# Patient Record
Sex: MALE | Race: OTHER | HISPANIC OR LATINO
[De-identification: names, ages, dates, MRNs, and addresses within clinical notes are randomized per-mention and may not be internally consistent; named-entity substitution may affect disease eponyms.]

---

## 2017-11-22 ENCOUNTER — LABORATORY RESULT (OUTPATIENT)
Age: 37
End: 2017-11-22

## 2017-11-22 ENCOUNTER — APPOINTMENT (OUTPATIENT)
Dept: INTERNAL MEDICINE | Facility: CLINIC | Age: 37
End: 2017-11-22
Payer: COMMERCIAL

## 2017-11-22 VITALS
DIASTOLIC BLOOD PRESSURE: 70 MMHG | OXYGEN SATURATION: 97 % | SYSTOLIC BLOOD PRESSURE: 113 MMHG | HEART RATE: 73 BPM | WEIGHT: 149 LBS | TEMPERATURE: 98.3 F | HEIGHT: 67 IN | BODY MASS INDEX: 23.39 KG/M2

## 2017-11-22 DIAGNOSIS — B35.1 TINEA UNGUIUM: ICD-10-CM

## 2017-11-22 DIAGNOSIS — B96.89 PERSONAL HISTORY OF OTHER DISEASES OF THE RESPIRATORY SYSTEM: ICD-10-CM

## 2017-11-22 DIAGNOSIS — Z87.09 PERSONAL HISTORY OF OTHER DISEASES OF THE RESPIRATORY SYSTEM: ICD-10-CM

## 2017-11-22 LAB
BILIRUB UR QL STRIP: NEGATIVE
CLARITY UR: CLEAR
COLLECTION METHOD: NORMAL
GLUCOSE UR-MCNC: NEGATIVE
HCG UR QL: 0.2 EU/DL
HGB UR QL STRIP.AUTO: NORMAL
KETONES UR-MCNC: NEGATIVE
LEUKOCYTE ESTERASE UR QL STRIP: NEGATIVE
NITRITE UR QL STRIP: NEGATIVE
PH UR STRIP: 6
PROT UR STRIP-MCNC: NEGATIVE
SP GR UR STRIP: 1.02

## 2017-11-22 PROCEDURE — 99395 PREV VISIT EST AGE 18-39: CPT | Mod: 25

## 2017-11-22 PROCEDURE — 36415 COLL VENOUS BLD VENIPUNCTURE: CPT

## 2017-11-22 PROCEDURE — 81002 URINALYSIS NONAUTO W/O SCOPE: CPT

## 2017-11-22 RX ORDER — AZITHROMYCIN 250 MG/1
250 TABLET, FILM COATED ORAL
Qty: 6 | Refills: 0 | Status: COMPLETED | COMMUNITY
Start: 2017-06-16

## 2017-11-26 LAB
25(OH)D3 SERPL-MCNC: 38 NG/ML
BASOPHILS # BLD AUTO: 0.01 K/UL
BASOPHILS NFR BLD AUTO: 0.2 %
CHOLEST SERPL-MCNC: 229 MG/DL
CHOLEST/HDLC SERPL: 5.9 RATIO
EOSINOPHIL # BLD AUTO: 0.15 K/UL
EOSINOPHIL NFR BLD AUTO: 2.5
HBA1C MFR BLD HPLC: 5.7 %
HCT VFR BLD CALC: 45.5 %
HDLC SERPL-MCNC: 39 MG/DL
HGB BLD-MCNC: 14.8 G/DL
IMM GRANULOCYTES NFR BLD AUTO: 0.2 %
LDLC SERPL CALC-MCNC: 138 MG/DL
LYMPHOCYTES # BLD AUTO: 2.78 K/UL
LYMPHOCYTES NFR BLD AUTO: 45.6 %
MAN DIFF?: NORMAL
MCHC RBC-ENTMCNC: 28.3 PG
MCHC RBC-ENTMCNC: 32.5 GM/DL
MCV RBC AUTO: 87 FL
MONOCYTES # BLD AUTO: 0.22 K/UL
MONOCYTES NFR BLD AUTO: 3.6 %
NEUTROPHILS # BLD AUTO: 2.92 K/UL
NEUTROPHILS NFR BLD AUTO: 47.9 %
PLATELET # BLD AUTO: 189 K/UL
RBC # BLD: 5.23 M/UL
RBC # FLD: 14.4 %
T4 SERPL-MCNC: 7.6 UG/DL
TRIGL SERPL-MCNC: 259 MG/DL
TSH SERPL-ACNC: 2.01 UIU/ML
WBC # FLD AUTO: 6.09 K/UL

## 2019-03-02 ENCOUNTER — TRANSCRIPTION ENCOUNTER (OUTPATIENT)
Age: 39
End: 2019-03-02

## 2019-03-20 ENCOUNTER — APPOINTMENT (OUTPATIENT)
Dept: INTERNAL MEDICINE | Facility: CLINIC | Age: 39
End: 2019-03-20
Payer: COMMERCIAL

## 2019-03-20 VITALS
TEMPERATURE: 98 F | OXYGEN SATURATION: 98 % | BODY MASS INDEX: 23.39 KG/M2 | DIASTOLIC BLOOD PRESSURE: 79 MMHG | HEART RATE: 72 BPM | SYSTOLIC BLOOD PRESSURE: 137 MMHG | WEIGHT: 149 LBS | HEIGHT: 67 IN

## 2019-03-20 DIAGNOSIS — Z00.00 ENCOUNTER FOR GENERAL ADULT MEDICAL EXAMINATION W/OUT ABNORMAL FINDINGS: ICD-10-CM

## 2019-03-20 LAB
BILIRUB UR QL STRIP: NEGATIVE
GLUCOSE UR-MCNC: NEGATIVE
HCG UR QL: 0.2 EU/DL
HGB UR QL STRIP.AUTO: NORMAL
KETONES UR-MCNC: NEGATIVE
LEUKOCYTE ESTERASE UR QL STRIP: NEGATIVE
NITRITE UR QL STRIP: NEGATIVE
PH UR STRIP: 7
PROT UR STRIP-MCNC: NEGATIVE
SP GR UR STRIP: 1.01

## 2019-03-20 PROCEDURE — 36415 COLL VENOUS BLD VENIPUNCTURE: CPT

## 2019-03-20 PROCEDURE — 99396 PREV VISIT EST AGE 40-64: CPT | Mod: 25

## 2019-03-20 NOTE — HISTORY OF PRESENT ILLNESS
[FreeTextEntry1] : Annual exam [de-identified] : Annual exam\par had flu shot\par Work for [employer]\par no complaints

## 2019-03-20 NOTE — HEALTH RISK ASSESSMENT
[Very Good] : ~his/her~  mood as very good [No falls in past year] : Patient reported no falls in the past year [] : No [0] : 1) Little interest or pleasure doing things: Not at all (0) [[id number]] : 0 [Change in mental status noted] : No change in mental status noted [Language] : denies difficulty with language [Behavior] : denies difficulty with behavior [Learning/Retaining New Information] : denies difficulty learning/retaining new information [Handling Complex Tasks] : denies difficulty handling complex tasks [Reasoning] : denies difficulty with reasoning [Spatial Ability and Orientation] : denies difficulty with spatial ability and orientation [None] : None [With Family] : lives with family [Employed] : employed [] :  [Sexually Active] : not sexually active [Feels Safe at Home] : Feels safe at home [Fully functional (using the telephone, shopping, preparing meals, housekeeping, doing laundry, using] : Fully functional and needs no help or supervision to perform IADLs (using the telephone, shopping, preparing meals, housekeeping, doing laundry, using transportation, managing medications and managing finances) [Fully functional (bathing, dressing, toileting, transferring, walking, feeding)] : Fully functional (bathing, dressing, toileting, transferring, walking, feeding) [Reports changes in vision] : Reports no changes in vision [Reports changes in hearing] : Reports no changes in hearing [Smoke Detector] : smoke detector [Reports changes in dental health] : Reports no changes in dental health [Carbon Monoxide Detector] : carbon monoxide detector [Guns at Home] : no guns at home [Seat Belt] :  uses seat belt

## 2019-03-21 LAB
25(OH)D3 SERPL-MCNC: 36.2 NG/ML
ALBUMIN SERPL ELPH-MCNC: 5.1 G/DL
ALP BLD-CCNC: 68 U/L
ALT SERPL-CCNC: 15 U/L
ANION GAP SERPL CALC-SCNC: 12 MMOL/L
AST SERPL-CCNC: 17 U/L
BASOPHILS # BLD AUTO: 0.02 K/UL
BASOPHILS NFR BLD AUTO: 0.4 %
BILIRUB SERPL-MCNC: 1 MG/DL
BUN SERPL-MCNC: 14 MG/DL
CALCIUM SERPL-MCNC: 10.1 MG/DL
CHLORIDE SERPL-SCNC: 101 MMOL/L
CHOLEST SERPL-MCNC: 208 MG/DL
CHOLEST/HDLC SERPL: 5.5 RATIO
CO2 SERPL-SCNC: 28 MMOL/L
CREAT SERPL-MCNC: 0.91 MG/DL
EOSINOPHIL # BLD AUTO: 0.12 K/UL
EOSINOPHIL NFR BLD AUTO: 2.3 %
GLUCOSE SERPL-MCNC: 102 MG/DL
HBA1C MFR BLD HPLC: 5.4 %
HCT VFR BLD CALC: 41.7 %
HDLC SERPL-MCNC: 38 MG/DL
HGB BLD-MCNC: 13.2 G/DL
IMM GRANULOCYTES NFR BLD AUTO: 0.2 %
LDLC SERPL CALC-MCNC: 108 MG/DL
LYMPHOCYTES # BLD AUTO: 1.97 K/UL
LYMPHOCYTES NFR BLD AUTO: 37 %
MAN DIFF?: NORMAL
MCHC RBC-ENTMCNC: 28.6 PG
MCHC RBC-ENTMCNC: 31.7 GM/DL
MCV RBC AUTO: 90.3 FL
MONOCYTES # BLD AUTO: 0.25 K/UL
MONOCYTES NFR BLD AUTO: 4.7 %
NEUTROPHILS # BLD AUTO: 2.96 K/UL
NEUTROPHILS NFR BLD AUTO: 55.4 %
PLATELET # BLD AUTO: 224 K/UL
POTASSIUM SERPL-SCNC: 4.3 MMOL/L
PROT SERPL-MCNC: 7.7 G/DL
RBC # BLD: 4.62 M/UL
RBC # FLD: 14.4 %
SODIUM SERPL-SCNC: 140 MMOL/L
T4 SERPL-MCNC: 6.8 UG/DL
TRIGL SERPL-MCNC: 311 MG/DL
TSH SERPL-ACNC: 1.92 UIU/ML
WBC # FLD AUTO: 5.33 K/UL

## 2023-01-01 NOTE — PHYSICAL EXAM
Recent blood test continue to show elevated calcium.  Your vitamin D is low at 14.  FAX ALL RESULTS to patient endocrinologist see patient care team [id number] [No Acute Distress] : no acute distress [Well Nourished] : well nourished [Well-Appearing] : well-appearing [Well Developed] : well developed [Normal Sclera/Conjunctiva] : normal sclera/conjunctiva [PERRL] : pupils equal round and reactive to light [Normal Outer Ear/Nose] : the outer ears and nose were normal in appearance [EOMI] : extraocular movements intact [Normal Oropharynx] : the oropharynx was normal [No JVD] : no jugular venous distention [Supple] : supple [No Lymphadenopathy] : no lymphadenopathy [Thyroid Normal, No Nodules] : the thyroid was normal and there were no nodules present [No Respiratory Distress] : no respiratory distress  [Clear to Auscultation] : lungs were clear to auscultation bilaterally [No Accessory Muscle Use] : no accessory muscle use [Normal Rate] : normal rate  [Regular Rhythm] : with a regular rhythm [Normal S1, S2] : normal S1 and S2 [No Carotid Bruits] : no carotid bruits [No Murmur] : no murmur heard [No Abdominal Bruit] : a ~M bruit was not heard ~T in the abdomen [Pedal Pulses Present] : the pedal pulses are present [No Varicosities] : no varicosities [No Edema] : there was no peripheral edema [No Extremity Clubbing/Cyanosis] : no extremity clubbing/cyanosis [No Palpable Aorta] : no palpable aorta [Non Tender] : non-tender [Soft] : abdomen soft [Non-distended] : non-distended [No Masses] : no abdominal mass palpated [No HSM] : no HSM [Normal Bowel Sounds] : normal bowel sounds [Normal Posterior Cervical Nodes] : no posterior cervical lymphadenopathy [Normal Anterior Cervical Nodes] : no anterior cervical lymphadenopathy [No CVA Tenderness] : no CVA  tenderness [No Spinal Tenderness] : no spinal tenderness [No Joint Swelling] : no joint swelling [Grossly Normal Strength/Tone] : grossly normal strength/tone [No Rash] : no rash [Normal Gait] : normal gait [Coordination Grossly Intact] : coordination grossly intact [No Focal Deficits] : no focal deficits [Deep Tendon Reflexes (DTR)] : deep tendon reflexes were 2+ and symmetric [Normal Affect] : the affect was normal [Normal Insight/Judgement] : insight and judgment were intact

## 2023-07-04 ENCOUNTER — NON-APPOINTMENT (OUTPATIENT)
Age: 43
End: 2023-07-04

## 2023-08-23 ENCOUNTER — APPOINTMENT (OUTPATIENT)
Dept: ORTHOPEDIC SURGERY | Facility: CLINIC | Age: 43
End: 2023-08-23
Payer: COMMERCIAL

## 2023-08-23 PROCEDURE — 73030 X-RAY EXAM OF SHOULDER: CPT | Mod: RT

## 2023-08-23 PROCEDURE — 20610 DRAIN/INJ JOINT/BURSA W/O US: CPT | Mod: RT

## 2023-08-23 PROCEDURE — 99204 OFFICE O/P NEW MOD 45 MIN: CPT | Mod: 25

## 2023-08-23 NOTE — PHYSICAL EXAM
[de-identified] : Constitutional o Appearance : well-nourished, well developed, alert, in no acute distress  Head and Face o Head :  Inspection : atraumatic, normocephalic o Face :  Inspection : no visible rash or discoloration Respiratory o Respiratory Effort: breathing unlabored  Neurologic o Sensation : Normal sensation  Psychiatric o Mood and Affect: mood normal, affect appropriate  Lymphatic o Additional Nodes : No palpable lymph nodes present   Cervical Spine o Inspection/Palpation :  Inspection : alignment midline, normal degree of lordosis present  Skin : normal appearance, no masses or tenderness, trachea midline  Palpation : no paracervical musculature is nontender to palpation o Range of Motion : arc of motion full in all planes, no crepitance or pain with ROM o Tests: Negative Spurlings test  Right Upper Extremity o Right Shoulder :  Inspection/Palpation : no AC joint or anterior capsular tenderness, no swelling or deformities  Range of Motion : pain with eccentric motion and initiation of forward flexion, no crepitance  Strength : forward elevation 4+/5 limited by pain, IR 4+/5, ER 4+/5, ER at 90 of abduction 5/5, supraspinatus 4+/5 limited by pain adduction 5/5, abduction 5/5, biceps/triceps 5/5,  5/5  Stability : no joint instability on provocative testing   Tests: Pepper positive (reverted to normal), Neer negative, Gilbert positive (reverted to normal) drop arm test negative, Randall's test negative  Left Upper Extremity o Left Shoulder :  Inspection/Palpation : no tenderness, no swelling or deformities  Range of Motion : full and painless in all planes, no crepitance  Strength : forward elevation 5/5, IR 5/5, ER 5/5, ER at 90 of abduction 5/5, supraspinatus 5/5, adduction 5/5, abduction 5/5, biceps/triceps 5/5,  5/5  Stability : no joint instability on provocative testing   Tests: Pepper negative, Neer negative, Gilbert negative, drop arm test negative, Randall's test negative  Gait and Station: Gait: gait normal, no significant extremity swelling or lymphedema, good proprioception and balance  Radiology Results:  o Right Shoulder: AP internal/external rotation and outlet views were obtained and revealed sclerosis of the greater tuberosity with slight a down sloping acromion and no significant degenerative arthritis   o Shoulder : Indication- right shoulder Impingement, Anatomic location- right subacromial space, Spray - area was sterilized with Betadine and alcohol and anesthetized with Ethyl Chloride , needle used-22G, Medications given- 5cc's lidocaine, 0.5cc's kenalog, 0.5 cc's dexamethasone. The patient tolerated the procedure well. He demonstrated significant improvement in ROM and strength immediately after the injection.

## 2023-08-23 NOTE — HISTORY OF PRESENT ILLNESS
[de-identified] : 43-year-old RHD male presents complaining of right shoulder pain that started about 1 year ago. It has progressively gotten worse over the past few months. He denies specific injury. He states that his pain is worst on the lateral aspect of his shoulder. It has radiated to his elbow on few occasions. He notes that he has good ROM if he moves slowly but notes weakness. He denies neck pain. Patient denies that he has any numbness or tingling.  Quick movements elicit sharp pain. Abduction and internal rotation tend to be the most uncomfortable. He has intermittent pain when sleeping. He does not feel he has lost strength. He has taken Tylenol, which helps a bit.

## 2023-08-23 NOTE — DISCUSSION/SUMMARY
[de-identified] : I went over the pathophysiology of the patient's symptoms in great detail with the patient. I discussed the underlying pathophysiology of the patient's condition in great detail with the patient. I went over the patient's x-rays with them in great detail. The patient elected to receive a cortisone injection into his left shoulder today and tolerated it well. I instructed the patient on ROM exercises and told them to take it easy. The use of ice and rest was reviewed with the patient. The patient may resume activities tomorrow. At-home strengthening exercises were discussed and demonstrated with the patient. He should avoid lifting the right shoulder overhead or past his shoulder level.   All of their questions were answered. They understand and consent to the plan.   FU in one month. If he does not improve, we will request an MRI.

## 2023-08-23 NOTE — ADDENDUM
[FreeTextEntry1] : I, BONG MELTON, acted solely as a scribe for Dr. South Jimenez on this date 08/23/2023.  All medical record entries made by the Scribe were at my, Dr. South Jimenez, direction and personally dictated by me on 08/23/2023. I have reviewed the chart and agree that the record accurately reflects my personal performance of the history, physical exam, assessment and plan. I have also personally directed, reviewed, and agreed with the chart.

## 2024-02-05 ENCOUNTER — APPOINTMENT (OUTPATIENT)
Dept: ORTHOPEDIC SURGERY | Facility: CLINIC | Age: 44
End: 2024-02-05
Payer: COMMERCIAL

## 2024-02-05 PROCEDURE — 99214 OFFICE O/P EST MOD 30 MIN: CPT

## 2024-02-05 NOTE — HISTORY OF PRESENT ILLNESS
[de-identified] : 43-year-old RHD male presents for follow up of right shoulder pain. He was last seen on 08/23/23. He was given a cortisone injection on 08/23/23. It has progressively gotten worse over the past few months. He denies specific injury. He states that his pain is worst on the lateral aspect of his shoulder. It has radiated to his elbow on few occasions. He notes that he has good ROM if he moves slowly but notes weakness. He denies neck pain. Patient denies that he has any numbness or tingling.  Quick movements elicit sharp pain. Abduction and internal rotation tend to be the most uncomfortable. He has intermittent pain when sleeping. He does not feel he has lost strength. He has taken Tylenol, which helps a bit.  He was given a steroid shot in the past and that made him have a whole-body rash.  He does not believe it helped.  He still complains of pain especially at night and with certain arcs of motion.  Radiology Results 08/23/23: o Right Shoulder: AP internal/external rotation and outlet views were obtained and revealed sclerosis of the greater tuberosity with slight a down sloping acromion and no significant degenerative arthritis  18

## 2024-02-05 NOTE — DISCUSSION/SUMMARY
[de-identified] : At this point since she did not improve with the steroid shot and continues to have pain despite home exercises we feel an MRI is necessary to further delineate his problem.  He will follow-up after the MRI is done

## 2024-02-05 NOTE — PHYSICAL EXAM
[de-identified] : Constitutional o Appearance : well-nourished, well developed, alert, in no acute distress  Head and Face o Head :  Inspection : atraumatic, normocephalic o Face :  Inspection : no visible rash or discoloration Respiratory o Respiratory Effort: breathing unlabored  Neurologic o Sensation : Normal sensation  Psychiatric o Mood and Affect: mood normal, affect appropriate  Lymphatic o Additional Nodes : No palpable lymph nodes present   Cervical Spine o Inspection/Palpation :  Inspection : alignment midline, normal degree of lordosis present  Skin : normal appearance, no masses or tenderness, trachea midline  Palpation : no paracervical musculature is nontender to palpation o Range of Motion : arc of motion full in all planes, no crepitance or pain with ROM o Tests: Negative Spurlings test  Right Upper Extremity o Right Shoulder :  Inspection/Palpation : no AC joint or anterior capsular tenderness, no swelling or deformities  Range of Motion : pain with eccentric motion and initiation of forward flexion, no crepitance  Strength : forward elevation 4+/5 limited by pain, IR 4+/5, ER 4+/5, ER at 90 of abduction 5/5, supraspinatus 4+/5 limited by pain adduction 5/5, abduction 5/5, biceps/triceps 5/5,  5/5  Stability : no joint instability on provocative testing   Tests: Pepper positive (reverted to normal), Neer negative, Gilbert positive (reverted to normal) drop arm test negative, Todd's test negative  Left Upper Extremity o Left Shoulder :  Inspection/Palpation : no tenderness, no swelling or deformities  Range of Motion : full and painless in all planes, no crepitance  Strength : forward elevation 5/5, IR 5/5, ER 5/5, ER at 90 of abduction 5/5, supraspinatus 5/5, adduction 5/5, abduction 5/5, biceps/triceps 5/5,  5/5  Stability : no joint instability on provocative testing   Tests: Pepper negative, Neer negative, Gilbert negative, drop arm test negative, Todd's test negative  Gait and Station: Gait: gait normal, no significant extremity swelling or lymphedema, good proprioception and balance

## 2024-02-26 ENCOUNTER — APPOINTMENT (OUTPATIENT)
Dept: MRI IMAGING | Facility: CLINIC | Age: 44
End: 2024-02-26
Payer: COMMERCIAL

## 2024-02-26 ENCOUNTER — OUTPATIENT (OUTPATIENT)
Dept: OUTPATIENT SERVICES | Facility: HOSPITAL | Age: 44
LOS: 1 days | End: 2024-02-26
Payer: COMMERCIAL

## 2024-02-26 DIAGNOSIS — M75.41 IMPINGEMENT SYNDROME OF RIGHT SHOULDER: ICD-10-CM

## 2024-02-26 PROCEDURE — 73221 MRI JOINT UPR EXTREM W/O DYE: CPT | Mod: 26,RT

## 2024-02-26 PROCEDURE — 73221 MRI JOINT UPR EXTREM W/O DYE: CPT

## 2024-02-29 ENCOUNTER — NON-APPOINTMENT (OUTPATIENT)
Age: 44
End: 2024-02-29

## 2024-03-05 ENCOUNTER — APPOINTMENT (OUTPATIENT)
Dept: ORTHOPEDIC SURGERY | Facility: CLINIC | Age: 44
End: 2024-03-05
Payer: COMMERCIAL

## 2024-03-05 PROCEDURE — 99214 OFFICE O/P EST MOD 30 MIN: CPT

## 2024-03-05 NOTE — PHYSICAL EXAM
[de-identified] : Constitutional o Appearance : well-nourished, well developed, alert, in no acute distress  Head and Face o Head :  Inspection : atraumatic, normocephalic o Face :  Inspection : no visible rash or discoloration Respiratory o Respiratory Effort: breathing unlabored  Neurologic o Sensation : Normal sensation  Psychiatric o Mood and Affect: mood normal, affect appropriate  Lymphatic o Additional Nodes : No palpable lymph nodes present   Cervical Spine o Inspection/Palpation :  Inspection : alignment midline, normal degree of lordosis present  Skin : normal appearance, no masses or tenderness, trachea midline  Palpation : no paracervical musculature is nontender to palpation o Range of Motion : arc of motion full in all planes, no crepitance or pain with ROM o Tests: Negative Spurlings test  Right Upper Extremity o Right Shoulder :  Inspection/Palpation : no AC joint or anterior capsular tenderness, no swelling or deformities  Range of Motion : pain with eccentric motion and initiation of forward flexion, no crepitance  Strength : forward elevation 4+/5 limited by pain, IR 4+/5, ER 4+/5, ER at 90 of abduction 5/5, supraspinatus 4+/5 limited by pain adduction 5/5, abduction 5/5, biceps/triceps 5/5,  5/5  Stability : no joint instability on provocative testing   Tests: Pepper positive (reverted to normal), Neer negative, Gilbert positive (reverted to normal) drop arm test negative, Lamb's test negative  Left Upper Extremity o Left Shoulder :  Inspection/Palpation : no tenderness, no swelling or deformities  Range of Motion : full and painless in all planes, no crepitance  Strength : forward elevation 5/5, IR 5/5, ER 5/5, ER at 90 of abduction 5/5, supraspinatus 5/5, adduction 5/5, abduction 5/5, biceps/triceps 5/5,  5/5  Stability : no joint instability on provocative testing   Tests: Pepper negative, Neer negative, Gilbert negative, drop arm test negative, Lamb's test negative  Gait and Station: Gait: gait normal, no significant extremity swelling or lymphedema, good proprioception and balance

## 2024-03-05 NOTE — HISTORY OF PRESENT ILLNESS
[de-identified] : 44-year-old RHD male presents for an MRI review of his right shoulder today 3/5/2024. I went over the patient's MRI results with them in great detail and used models to aid in my explanation. Patient denies that he has any numbness or tingling.   [facility] MRI OF THE RIGHT SHOULDER revealed: done on 2/26/2024  IMPRESSION:  1.  Severe supraspinatus tendinosis with superimposed high-grade partial-thickness articular-sided tear of the distal fibers and focal full-thickness perforation of the insertional fibers. Moderate subscapularis tendinosis with low-grade partial-thickness articular sided tearing of the cranial fibers. 2.  Mildly decreased acromiohumeral interval, possible mild subacromial impingement.  --- End of Report ---    Radiology Results 08/23/23: o Right Shoulder: AP internal/external rotation and outlet views were obtained and revealed sclerosis of the greater tuberosity with slight a down sloping acromion and no significant degenerative arthritis

## 2024-03-05 NOTE — DISCUSSION/SUMMARY
[de-identified] : I went over the pathophysiology of the patient's symptoms in great detail with the patient. I went over the patient's MRI results with them in great detail and used models to aid in my explanation. I informed the patient that surgery, a right shoulder arthroscopy, will be required to provide them long term relief from their symptoms. Patient understands that he needs to consider an arthroscopy given conservative measures are not providing enough relief and due to the results of the MRI. We had a lengthy discussion about the patient's issues, and talked about the benefits and risks of the procedure. The patient understands the most common risks include infection, allergy to the anesthetic and stiffness of the shoulder. The risks are accepted. The patient was given no assurances that all the symptoms will be alleviated. I informed the patient that I no longer operate. I provided the names of Dr. Pablo and Dr. Keen.   All of their questions were answered. They understand and consent to the plan.   GAMAL ALMONTEN.

## 2024-03-05 NOTE — ADDENDUM
[FreeTextEntry1] : I, BONG MELTON, acted solely as a scribe for Dr. South Jimenez on this date 03/05/2024.  All medical record entries made by the Scribe were at my, Dr. South Jimenez, direction and personally dictated by me on 03/05/2024. I have reviewed the chart and agree that the record accurately reflects my personal performance of the history, physical exam, assessment and plan. I have also personally directed, reviewed, and agreed with the chart.

## 2024-03-20 ENCOUNTER — APPOINTMENT (OUTPATIENT)
Dept: ORTHOPEDIC SURGERY | Facility: CLINIC | Age: 44
End: 2024-03-20
Payer: COMMERCIAL

## 2024-03-20 VITALS — BODY MASS INDEX: 25.11 KG/M2 | WEIGHT: 160 LBS | HEIGHT: 67 IN

## 2024-03-20 DIAGNOSIS — M75.41 IMPINGEMENT SYNDROME OF RIGHT SHOULDER: ICD-10-CM

## 2024-03-20 DIAGNOSIS — M75.101 UNSPECIFIED ROTATOR CUFF TEAR OR RUPTURE OF RIGHT SHOULDER, NOT SPECIFIED AS TRAUMATIC: ICD-10-CM

## 2024-03-20 PROCEDURE — 99214 OFFICE O/P EST MOD 30 MIN: CPT

## 2024-06-27 ENCOUNTER — OUTPATIENT (OUTPATIENT)
Dept: OUTPATIENT SERVICES | Facility: HOSPITAL | Age: 44
LOS: 1 days | End: 2024-06-27
Payer: COMMERCIAL

## 2024-06-27 VITALS
SYSTOLIC BLOOD PRESSURE: 118 MMHG | HEIGHT: 67 IN | RESPIRATION RATE: 16 BRPM | DIASTOLIC BLOOD PRESSURE: 76 MMHG | TEMPERATURE: 98 F | OXYGEN SATURATION: 98 % | WEIGHT: 160.06 LBS | HEART RATE: 67 BPM

## 2024-06-27 DIAGNOSIS — M75.101 UNSPECIFIED ROTATOR CUFF TEAR OR RUPTURE OF RIGHT SHOULDER, NOT SPECIFIED AS TRAUMATIC: ICD-10-CM

## 2024-06-27 DIAGNOSIS — Z01.818 ENCOUNTER FOR OTHER PREPROCEDURAL EXAMINATION: ICD-10-CM

## 2024-06-27 PROCEDURE — G0463: CPT

## 2024-07-15 ENCOUNTER — TRANSCRIPTION ENCOUNTER (OUTPATIENT)
Age: 44
End: 2024-07-15

## 2024-07-15 PROBLEM — Z87.39 PERSONAL HISTORY OF OTHER DISEASES OF THE MUSCULOSKELETAL SYSTEM AND CONNECTIVE TISSUE: Chronic | Status: ACTIVE | Noted: 2024-06-27

## 2024-07-15 PROBLEM — K21.9 GASTRO-ESOPHAGEAL REFLUX DISEASE WITHOUT ESOPHAGITIS: Chronic | Status: ACTIVE | Noted: 2024-06-27

## 2024-07-16 ENCOUNTER — OUTPATIENT (OUTPATIENT)
Dept: OUTPATIENT SERVICES | Facility: HOSPITAL | Age: 44
LOS: 1 days | End: 2024-07-16
Payer: COMMERCIAL

## 2024-07-16 ENCOUNTER — APPOINTMENT (OUTPATIENT)
Dept: ORTHOPEDIC SURGERY | Facility: HOSPITAL | Age: 44
End: 2024-07-16

## 2024-07-16 ENCOUNTER — TRANSCRIPTION ENCOUNTER (OUTPATIENT)
Age: 44
End: 2024-07-16

## 2024-07-16 VITALS
SYSTOLIC BLOOD PRESSURE: 132 MMHG | OXYGEN SATURATION: 99 % | WEIGHT: 149.03 LBS | HEART RATE: 70 BPM | HEIGHT: 67 IN | DIASTOLIC BLOOD PRESSURE: 79 MMHG | TEMPERATURE: 97 F | RESPIRATION RATE: 15 BRPM

## 2024-07-16 VITALS
DIASTOLIC BLOOD PRESSURE: 68 MMHG | OXYGEN SATURATION: 99 % | SYSTOLIC BLOOD PRESSURE: 112 MMHG | RESPIRATION RATE: 16 BRPM | TEMPERATURE: 97 F | HEART RATE: 74 BPM

## 2024-07-16 DIAGNOSIS — M75.101 UNSPECIFIED ROTATOR CUFF TEAR OR RUPTURE OF RIGHT SHOULDER, NOT SPECIFIED AS TRAUMATIC: ICD-10-CM

## 2024-07-16 PROCEDURE — C1713: CPT

## 2024-07-16 PROCEDURE — 29827 SHO ARTHRS SRG RT8TR CUF RPR: CPT | Mod: RT

## 2024-07-16 PROCEDURE — C9399: CPT

## 2024-07-16 DEVICE — SUT ANCHOR FIBERTAK TRIPLE LOAD TAPE BL/W BLK/W W: Type: IMPLANTABLE DEVICE | Site: RIGHT | Status: FUNCTIONAL

## 2024-07-16 DEVICE — IMP SPEEDBRIDGE W/BIO-COMP SWIVLCK/NDL 4.75X19.1MM: Type: IMPLANTABLE DEVICE | Site: RIGHT | Status: FUNCTIONAL

## 2024-07-16 RX ORDER — ONDANSETRON HYDROCHLORIDE 2 MG/ML
4 INJECTION INTRAMUSCULAR; INTRAVENOUS ONCE
Refills: 0 | Status: DISCONTINUED | OUTPATIENT
Start: 2024-07-16 | End: 2024-07-16

## 2024-07-16 RX ORDER — LEVOCETIRIZINE DIHYDROCHLORIDE 5 MG/1
1 TABLET ORAL
Refills: 0 | DISCHARGE

## 2024-07-16 RX ORDER — OMEPRAZOLE 10 MG/1
1 CAPSULE, DELAYED RELEASE ORAL
Refills: 0 | DISCHARGE

## 2024-07-16 RX ORDER — APREPITANT 125MG-80MG
40 KIT ORAL ONCE
Refills: 0 | Status: COMPLETED | OUTPATIENT
Start: 2024-07-16 | End: 2024-07-16

## 2024-07-16 RX ORDER — HYDROMORPHONE HCL 0.2 MG/ML
1 INJECTION, SOLUTION INTRAVENOUS
Refills: 0 | Status: DISCONTINUED | OUTPATIENT
Start: 2024-07-16 | End: 2024-07-16

## 2024-07-16 RX ORDER — HYDROMORPHONE HCL 0.2 MG/ML
0.5 INJECTION, SOLUTION INTRAVENOUS
Refills: 0 | Status: DISCONTINUED | OUTPATIENT
Start: 2024-07-16 | End: 2024-07-16

## 2024-07-16 RX ORDER — OXYCODONE HYDROCHLORIDE 100 MG/5ML
1 SOLUTION ORAL
Qty: 28 | Refills: 0
Start: 2024-07-16 | End: 2024-07-22

## 2024-07-16 RX ORDER — OXYCODONE HYDROCHLORIDE 100 MG/5ML
5 SOLUTION ORAL ONCE
Refills: 0 | Status: DISCONTINUED | OUTPATIENT
Start: 2024-07-16 | End: 2024-07-16

## 2024-07-16 RX ORDER — DEXTROSE MONOHYDRATE AND SODIUM CHLORIDE 5; .3 G/100ML; G/100ML
1000 INJECTION, SOLUTION INTRAVENOUS
Refills: 0 | Status: DISCONTINUED | OUTPATIENT
Start: 2024-07-16 | End: 2024-07-16

## 2024-07-16 RX ORDER — CEFAZOLIN 10 G/1
2000 INJECTION, POWDER, FOR SOLUTION INTRAVENOUS ONCE
Refills: 0 | Status: COMPLETED | OUTPATIENT
Start: 2024-07-16 | End: 2024-07-16

## 2024-07-16 RX ADMIN — APREPITANT 40 MILLIGRAM(S): KIT at 08:34

## 2024-07-16 RX ADMIN — Medication 1 APPLICATION(S): at 08:34

## 2024-07-16 RX ADMIN — DEXTROSE MONOHYDRATE AND SODIUM CHLORIDE 75 MILLILITER(S): 5; .3 INJECTION, SOLUTION INTRAVENOUS at 12:39

## 2024-07-16 NOTE — ASU PREOP CHECKLIST - ADDITIONAL CONSENTS
From: Lazara BRITT  To: Lanre Sofia  Sent: 1/27/2022 10:57 AM CST  Subject: Appointment    Good morning Althea Baum sent your refills. She also wanted me to remind you that you will be due for you follow up with her in March.    PEÑA Haile   Rep

## 2024-07-16 NOTE — BRIEF OPERATIVE NOTE - NSICDXBRIEFPROCEDURE_GEN_ALL_CORE_FT
PROCEDURES:  Repair of rotator cuff 16-Jul-2024 15:03:28 Right Isaac Fernandes  Arthroscopy of shoulder 16-Jul-2024 15:03:32 Right Isaac Fernandes

## 2024-07-16 NOTE — ASU DISCHARGE PLAN (ADULT/PEDIATRIC) - CARE PROVIDER_API CALL
Mg Pablo  Orthopaedic Surgery  [address], [address]  [place] [address]  Phone: [phone]  Fax: [phone]  Follow Up Time:

## 2024-07-22 RX ORDER — OXYCODONE 5 MG/1
5 TABLET ORAL TWICE DAILY
Qty: 14 | Refills: 0 | Status: ACTIVE | COMMUNITY
Start: 2024-07-22 | End: 1900-01-01

## 2024-07-24 ENCOUNTER — APPOINTMENT (OUTPATIENT)
Dept: ORTHOPEDIC SURGERY | Facility: CLINIC | Age: 44
End: 2024-07-24
Payer: COMMERCIAL

## 2024-07-24 VITALS — WEIGHT: 150 LBS | HEIGHT: 67 IN | BODY MASS INDEX: 23.54 KG/M2

## 2024-07-24 DIAGNOSIS — M75.101 UNSPECIFIED ROTATOR CUFF TEAR OR RUPTURE OF RIGHT SHOULDER, NOT SPECIFIED AS TRAUMATIC: ICD-10-CM

## 2024-07-24 PROCEDURE — 99024 POSTOP FOLLOW-UP VISIT: CPT

## 2024-07-24 RX ORDER — CYCLOBENZAPRINE HYDROCHLORIDE 5 MG/1
5 TABLET, FILM COATED ORAL
Qty: 30 | Refills: 0 | Status: ACTIVE | COMMUNITY
Start: 2024-07-24 | End: 1900-01-01

## 2024-07-24 NOTE — HISTORY OF PRESENT ILLNESS
[Doing Well] : is doing well [Adequate Pain Control] : has adequate pain control [de-identified] : s/p Right shoulder arthroscopy with rotator cuff repair and synovectomy on 7/16/2024 [de-identified] : 44 year male presents for a post operative evaluation s/p Right shoulder arthroscopy with rotator cuff repair and synovectomy on 7/16/2024. Patient states he is feeling good post operatively. Patient presents in a sling.  Patient denies fever, chills, nausea or vomiting. Patient is here today for wound check and clinical evaluation. Patient is taking tylenol and oxycodone for pain medication with relief.  He reports pain is worst at night. [de-identified] : Right Upper Extremity o Shoulder :  Inspection/Palpation : diffuse tenderness to palpation, mild diffuse swelling, ecchymosis, no deformities, sutures intact, clean and dry. no discharge, or erythema.   Range of Motion :not assessed today due to post operative status.  Strength : not assessed today due to post operative status.  Stability : no joint instability on provocative testing  Tests/Signs : not assessed today due to post operative status. o Upper Arm : no tenderness, no swelling, no deformities o Muscle Bulk : no atrophy o Sensation : sensation intact to light touch o Skin : no skin rash or discoloration o Vascular Exam : no edema, no cyanosis, radial and ulnar pulses normal  [de-identified] : None [de-identified] : Arthroscopy images reviewed in great detail with patient.  Sutures were removed and Steri-Strips were placed. He was instructed to allow the Steri-Strips to fall off on their own.  Activity modifications and restrictions were discussed.  He is to continue use of the sling.  A home exercise sheet was given and discussed with the patient to follow. A post-operative physical therapy protocol was also provided.  I demonstrated how to perform a table slide exercise. He is to perform this exercise, as well as, assisted elbow flexion and extension.   A prescription was provided for Cyclobenzaprine HCL 5mg. Discussed taking 1 tablet, 1 hour prior to bedtime to help with sleep disturbances due to pain.  FU 4 weeks.  All questions were answered, all alternatives discussed and the patient is in complete agreement with that plan. Follow-up appointment as instructed. Any issues and the patient will call or come in sooner.

## 2024-07-24 NOTE — END OF VISIT
[1. Privacy issue, precluded by [facility] or Federal Law] : Reason - Privacy issue, precluded by [facility] or Federal Law

## 2024-07-24 NOTE — END OF VISIT
[1. Privacy issue, precluded by [facility] or Federal Law] : Reason - Privacy issue, precluded by [facility] or Federal Law

## 2024-07-24 NOTE — HISTORY OF PRESENT ILLNESS
[Doing Well] : is doing well [Adequate Pain Control] : has adequate pain control [de-identified] : s/p Right shoulder arthroscopy with rotator cuff repair and synovectomy on 7/16/2024 [de-identified] : 44 year male presents for a post operative evaluation s/p Right shoulder arthroscopy with rotator cuff repair and synovectomy on 7/16/2024. Patient states he is feeling good post operatively. Patient presents in a sling.  Patient denies fever, chills, nausea or vomiting. Patient is here today for wound check and clinical evaluation. Patient is taking tylenol and oxycodone for pain medication with relief.  He reports pain is worst at night. [de-identified] : Right Upper Extremity o Shoulder :  Inspection/Palpation : diffuse tenderness to palpation, mild diffuse swelling, ecchymosis, no deformities, sutures intact, clean and dry. no discharge, or erythema.   Range of Motion :not assessed today due to post operative status.  Strength : not assessed today due to post operative status.  Stability : no joint instability on provocative testing  Tests/Signs : not assessed today due to post operative status. o Upper Arm : no tenderness, no swelling, no deformities o Muscle Bulk : no atrophy o Sensation : sensation intact to light touch o Skin : no skin rash or discoloration o Vascular Exam : no edema, no cyanosis, radial and ulnar pulses normal  [de-identified] : None [de-identified] : Arthroscopy images reviewed in great detail with patient.  Sutures were removed and Steri-Strips were placed. He was instructed to allow the Steri-Strips to fall off on their own.  Activity modifications and restrictions were discussed.  He is to continue use of the sling.  A home exercise sheet was given and discussed with the patient to follow. A post-operative physical therapy protocol was also provided.  I demonstrated how to perform a table slide exercise. He is to perform this exercise, as well as, assisted elbow flexion and extension.   A prescription was provided for Cyclobenzaprine HCL 5mg. Discussed taking 1 tablet, 1 hour prior to bedtime to help with sleep disturbances due to pain.  FU 4 weeks.  All questions were answered, all alternatives discussed and the patient is in complete agreement with that plan. Follow-up appointment as instructed. Any issues and the patient will call or come in sooner.

## 2024-07-26 ENCOUNTER — NON-APPOINTMENT (OUTPATIENT)
Age: 44
End: 2024-07-26

## 2024-08-27 ENCOUNTER — NON-APPOINTMENT (OUTPATIENT)
Age: 44
End: 2024-08-27

## 2024-08-28 ENCOUNTER — APPOINTMENT (OUTPATIENT)
Dept: ORTHOPEDIC SURGERY | Facility: CLINIC | Age: 44
End: 2024-08-28
Payer: COMMERCIAL

## 2024-08-28 DIAGNOSIS — M75.101 UNSPECIFIED ROTATOR CUFF TEAR OR RUPTURE OF RIGHT SHOULDER, NOT SPECIFIED AS TRAUMATIC: ICD-10-CM

## 2024-08-28 PROCEDURE — 99024 POSTOP FOLLOW-UP VISIT: CPT

## 2024-08-28 NOTE — ADDENDUM
[FreeTextEntry1] :  I, Luis A Morris Jr, acted solely as a scribe for Dr. Mg Pablo on this date 08/28/2024.    All medical record entries made by the Scribe were at my, Dr. Mg Pablo, direction and personally dictated by me on 08/28/2024. I have reviewed the chart and agree that the record accurately reflects my personal performance of the history, physical exam, assessment and plan. I have also personally directed, reviewed, and agreed with the chart.

## 2024-08-28 NOTE — END OF VISIT
[1. Privacy issue, precluded by [facility] or Federal Law] : Reason - Privacy issue, precluded by [facility] or Federal Law

## 2024-08-28 NOTE — HISTORY OF PRESENT ILLNESS
[Doing Well] : is doing well [Adequate Pain Control] : has adequate pain control [de-identified] : s/p Right shoulder arthroscopy with rotator cuff repair and synovectomy on 7/16/2024 [de-identified] : 44 year male presents for a post operative evaluation s/p Right shoulder arthroscopy with rotator cuff repair and synovectomy on 7/16/2024. Patient states he is feeling good post operatively. Patient presents in a sling.  Patient denies fever, chills, nausea or vomiting. Patient is here today for wound check and clinical evaluation. He states that he has no pain present at this time. He is able to sleep at night without the sling without pain. The patient currently takes no pain medication at this time.  [de-identified] : Right Upper Extremity o Shoulder :   Inspection/Palpation : no tenderness to palpation, no swelling, no ecchymosis, no deformities, portal sites well healed  Range of Motion : Passive forward elevation: measured at 115 degrees external rotation: measured at 10 degrees internal rotation: measured at PSIS.  Strength : external rotation 5/5, internal rotation 5/5, supraspinatus 5/5   Stability : no joint instability on provocative testing o Upper Arm : no tenderness, no swelling, no deformities o Muscle Bulk : no atrophy o Sensation : sensation intact to light touch o Skin : no skin rash or discoloration o Vascular Exam : no edema, no cyanosis, radial and ulnar pulses normal  [de-identified] : None [de-identified] : Discontinue use of the sling.  Patient is to begin physical therapy. A prescription for Physical Therapy was provided.  FU 6 weeks.  All questions were answered, all alternatives discussed and the patient is in complete agreement with that plan. Follow-up appointment as instructed. Any issues and the patient will call or come in sooner.

## 2024-08-28 NOTE — END OF VISIT
[1. Privacy issue, precluded by [facility] or Federal Law] : Reason - Privacy issue, precluded by [facility] or Federal Law

## 2024-08-28 NOTE — HISTORY OF PRESENT ILLNESS
[Doing Well] : is doing well [Adequate Pain Control] : has adequate pain control [de-identified] : s/p Right shoulder arthroscopy with rotator cuff repair and synovectomy on 7/16/2024 [de-identified] : 44 year male presents for a post operative evaluation s/p Right shoulder arthroscopy with rotator cuff repair and synovectomy on 7/16/2024. Patient states he is feeling good post operatively. Patient presents in a sling.  Patient denies fever, chills, nausea or vomiting. Patient is here today for wound check and clinical evaluation. He states that he has no pain present at this time. He is able to sleep at night without the sling without pain. The patient currently takes no pain medication at this time.  [de-identified] : Right Upper Extremity o Shoulder :   Inspection/Palpation : no tenderness to palpation, no swelling, no ecchymosis, no deformities, portal sites well healed  Range of Motion : Passive forward elevation: measured at 115 degrees external rotation: measured at 10 degrees internal rotation: measured at PSIS.  Strength : external rotation 5/5, internal rotation 5/5, supraspinatus 5/5   Stability : no joint instability on provocative testing o Upper Arm : no tenderness, no swelling, no deformities o Muscle Bulk : no atrophy o Sensation : sensation intact to light touch o Skin : no skin rash or discoloration o Vascular Exam : no edema, no cyanosis, radial and ulnar pulses normal  [de-identified] : None [de-identified] : Discontinue use of the sling.  Patient is to begin physical therapy. A prescription for Physical Therapy was provided.  FU 6 weeks.  All questions were answered, all alternatives discussed and the patient is in complete agreement with that plan. Follow-up appointment as instructed. Any issues and the patient will call or come in sooner.

## 2024-10-09 ENCOUNTER — APPOINTMENT (OUTPATIENT)
Dept: ORTHOPEDIC SURGERY | Facility: CLINIC | Age: 44
End: 2024-10-09
Payer: COMMERCIAL

## 2024-10-09 VITALS — BODY MASS INDEX: 24.33 KG/M2 | WEIGHT: 155 LBS | HEIGHT: 67 IN

## 2024-10-09 DIAGNOSIS — M75.101 UNSPECIFIED ROTATOR CUFF TEAR OR RUPTURE OF RIGHT SHOULDER, NOT SPECIFIED AS TRAUMATIC: ICD-10-CM

## 2024-10-09 PROCEDURE — 99024 POSTOP FOLLOW-UP VISIT: CPT

## 2024-10-16 ENCOUNTER — NON-APPOINTMENT (OUTPATIENT)
Age: 44
End: 2024-10-16

## 2024-12-11 ENCOUNTER — APPOINTMENT (OUTPATIENT)
Dept: ORTHOPEDIC SURGERY | Facility: CLINIC | Age: 44
End: 2024-12-11
Payer: COMMERCIAL

## 2024-12-11 VITALS — WEIGHT: 160 LBS | BODY MASS INDEX: 25.11 KG/M2 | HEIGHT: 67 IN

## 2024-12-11 DIAGNOSIS — M75.101 UNSPECIFIED ROTATOR CUFF TEAR OR RUPTURE OF RIGHT SHOULDER, NOT SPECIFIED AS TRAUMATIC: ICD-10-CM

## 2024-12-11 PROCEDURE — 99213 OFFICE O/P EST LOW 20 MIN: CPT

## 2025-01-16 ENCOUNTER — NON-APPOINTMENT (OUTPATIENT)
Age: 45
End: 2025-01-16

## 2025-07-16 ENCOUNTER — NON-APPOINTMENT (OUTPATIENT)
Age: 45
End: 2025-07-16

## (undated) DEVICE — ARTHREX MULTIFIRE SCORPION NEEDLE

## (undated) DEVICE — BAG SPONGE COUNTER EZ

## (undated) DEVICE — SUT FIBERLINK 1.3MM (WHITE/BLACK)

## (undated) DEVICE — TUBING DEPUY MITEK FMS OUTFLOW

## (undated) DEVICE — SLING SHOULDER IMMOBILIZER CLINIC LARGE

## (undated) DEVICE — POSITIONER OR TABLE/STRETCHER STRAP

## (undated) DEVICE — ELCTR BOVIE TIP BLADE INSULATED 2.75" EDGE

## (undated) DEVICE — GLV 8 PROTEXIS (WHITE)

## (undated) DEVICE — SUT MONOSOF 4-0 18" P-12

## (undated) DEVICE — NDL SPINAL 18G X 3.5" (PINK)

## (undated) DEVICE — SUT NDL FOR EXPRESSEW III FLEXIBLE SUTURE PASSER

## (undated) DEVICE — CANNULA LINVATEC SHOULDER 7CM (GREY & ORANGE)

## (undated) DEVICE — SHAVER BLADE LINVATEC ULTRAFRR 3.5MM

## (undated) DEVICE — PACK SHOULDER ARTHROSCOPY

## (undated) DEVICE — DRAPE INSTRUMENT POUCH 6.75" X 11"

## (undated) DEVICE — ARTHREX KIT FOR 2.6 FIBERTAK ANCHORS

## (undated) DEVICE — LAP PAD W RING 18 X 18"

## (undated) DEVICE — SYM-ARTHROSCOPY SHAVER: Type: DURABLE MEDICAL EQUIPMENT

## (undated) DEVICE — SOL IRR BAG NS 0.9% 3000ML

## (undated) DEVICE — BUR LINVATEC OVAL 4MM

## (undated) DEVICE — DVC SUCTION FLR PUDDLE GUPPY

## (undated) DEVICE — BLADE SCALPEL SAFETYLOCK #11

## (undated) DEVICE — WARMING BLANKET LOWER ADULT

## (undated) DEVICE — VENODYNE/SCD SLEEVE CALF MEDIUM

## (undated) DEVICE — ADAPTER DUAL SPIKE

## (undated) DEVICE — DRAPE 3/4 SHEET 52X76"

## (undated) DEVICE — ELCTR VAPR COOL PULSE

## (undated) DEVICE — SUT FIBERLINK 2 26" (BLUE)

## (undated) DEVICE — TUBING DEPUY MITEK FMS VUE INFLOW

## (undated) DEVICE — Device

## (undated) DEVICE — WARMING GOWN BAIR PAWS STANDARD

## (undated) DEVICE — TUBING SUCTION 20FT

## (undated) DEVICE — DRAPE TOWEL BLUE 17" X 24"

## (undated) DEVICE — SLING SHOULDER ABDUCTION LARGE

## (undated) DEVICE — CANNULA ARTHREX TWIST IN NO SQUIRT CAP 7X7 PURPLE

## (undated) DEVICE — DRAPE TOP SHEET 53" X 101"

## (undated) DEVICE — SUT FIBERINK WITH LOOP 1.3MM (WHITE /BLUE)

## (undated) DEVICE — NDL HYPO SAFE 22G X 1.5" (BLACK)

## (undated) DEVICE — ELCTR VAPR TRIPOLAR 90 DEGREE